# Patient Record
Sex: FEMALE | Race: WHITE | NOT HISPANIC OR LATINO | Employment: FULL TIME | URBAN - METROPOLITAN AREA
[De-identification: names, ages, dates, MRNs, and addresses within clinical notes are randomized per-mention and may not be internally consistent; named-entity substitution may affect disease eponyms.]

---
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
## 2022-07-07 ENCOUNTER — OFFICE VISIT (OUTPATIENT)
Dept: URGENT CARE | Facility: CLINIC | Age: 22
End: 2022-07-07
Payer: MEDICAID

## 2022-07-07 VITALS
WEIGHT: 125 LBS | TEMPERATURE: 98 F | HEIGHT: 65 IN | RESPIRATION RATE: 16 BRPM | SYSTOLIC BLOOD PRESSURE: 104 MMHG | HEART RATE: 102 BPM | DIASTOLIC BLOOD PRESSURE: 65 MMHG | BODY MASS INDEX: 20.83 KG/M2 | OXYGEN SATURATION: 98 %

## 2022-07-07 DIAGNOSIS — U07.1 COVID-19 VIRUS INFECTION: Primary | ICD-10-CM

## 2022-07-07 DIAGNOSIS — R09.81 SINUS CONGESTION: ICD-10-CM

## 2022-07-07 LAB
CTP QC/QA: YES
SARS-COV-2 RDRP RESP QL NAA+PROBE: POSITIVE

## 2022-07-07 PROCEDURE — 3008F BODY MASS INDEX DOCD: CPT | Mod: CPTII,S$GLB,, | Performed by: PHYSICIAN ASSISTANT

## 2022-07-07 PROCEDURE — 3074F PR MOST RECENT SYSTOLIC BLOOD PRESSURE < 130 MM HG: ICD-10-PCS | Mod: CPTII,S$GLB,, | Performed by: PHYSICIAN ASSISTANT

## 2022-07-07 PROCEDURE — 3008F PR BODY MASS INDEX (BMI) DOCUMENTED: ICD-10-PCS | Mod: CPTII,S$GLB,, | Performed by: PHYSICIAN ASSISTANT

## 2022-07-07 PROCEDURE — U0002: ICD-10-PCS | Mod: QW,S$GLB,, | Performed by: PHYSICIAN ASSISTANT

## 2022-07-07 PROCEDURE — 3078F PR MOST RECENT DIASTOLIC BLOOD PRESSURE < 80 MM HG: ICD-10-PCS | Mod: CPTII,S$GLB,, | Performed by: PHYSICIAN ASSISTANT

## 2022-07-07 PROCEDURE — 99213 PR OFFICE/OUTPT VISIT, EST, LEVL III, 20-29 MIN: ICD-10-PCS | Mod: S$GLB,,, | Performed by: PHYSICIAN ASSISTANT

## 2022-07-07 PROCEDURE — U0002 COVID-19 LAB TEST NON-CDC: HCPCS | Mod: QW,S$GLB,, | Performed by: PHYSICIAN ASSISTANT

## 2022-07-07 PROCEDURE — 3078F DIAST BP <80 MM HG: CPT | Mod: CPTII,S$GLB,, | Performed by: PHYSICIAN ASSISTANT

## 2022-07-07 PROCEDURE — 1159F MED LIST DOCD IN RCRD: CPT | Mod: CPTII,S$GLB,, | Performed by: PHYSICIAN ASSISTANT

## 2022-07-07 PROCEDURE — 3074F SYST BP LT 130 MM HG: CPT | Mod: CPTII,S$GLB,, | Performed by: PHYSICIAN ASSISTANT

## 2022-07-07 PROCEDURE — 1160F RVW MEDS BY RX/DR IN RCRD: CPT | Mod: CPTII,S$GLB,, | Performed by: PHYSICIAN ASSISTANT

## 2022-07-07 PROCEDURE — 99213 OFFICE O/P EST LOW 20 MIN: CPT | Mod: S$GLB,,, | Performed by: PHYSICIAN ASSISTANT

## 2022-07-07 PROCEDURE — 1159F PR MEDICATION LIST DOCUMENTED IN MEDICAL RECORD: ICD-10-PCS | Mod: CPTII,S$GLB,, | Performed by: PHYSICIAN ASSISTANT

## 2022-07-07 PROCEDURE — 1160F PR REVIEW ALL MEDS BY PRESCRIBER/CLIN PHARMACIST DOCUMENTED: ICD-10-PCS | Mod: CPTII,S$GLB,, | Performed by: PHYSICIAN ASSISTANT

## 2022-07-07 RX ORDER — VENLAFAXINE 75 MG/1
75 TABLET ORAL 2 TIMES DAILY
COMMUNITY

## 2022-07-08 NOTE — PROGRESS NOTES
"Subjective:       Patient ID: Sima Jaimes is a 21 y.o. female.    Vitals:  height is 5' 5" (1.651 m) and weight is 56.7 kg (125 lb). Her temperature is 98.4 °F (36.9 °C). Her blood pressure is 104/65 and her pulse is 102. Her respiration is 16 and oxygen saturation is 98%.     Chief Complaint: Sinus Problem    Patient presents to urgent care with sinus congestion/pressure, PND and headache x 2 days. Patient has been taking OTC Ibuprofen with temporary relief. 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 Patient is COVID-19 vaccinated.    Sinus Problem  This is a new problem. The current episode started in the past 7 days. The problem is unchanged. There has been no fever. Her pain is at a severity of 3/10. The pain is mild. Associated symptoms include congestion, headaches, sinus pressure and a sore throat. Pertinent negatives include no chills, coughing, diaphoresis, ear pain, hoarse voice, neck pain, shortness of breath, sneezing or swollen glands. Treatments tried: OTC Ibuprofen. The treatment provided mild relief.       Constitution: Negative for chills, sweating, fatigue and fever.   HENT: Positive for congestion, postnasal drip, sinus pressure and sore throat. Negative for ear pain, drooling, nosebleeds, foreign body in nose, sinus pain, trouble swallowing and voice change.    Neck: Negative for neck pain, neck stiffness, painful lymph nodes and neck swelling.   Cardiovascular: Negative for chest pain, leg swelling, palpitations, sob on exertion and passing out.   Eyes: Negative for eye pain, eye redness, photophobia, double vision, blurred vision and eyelid swelling.   Respiratory: Negative for chest tightness, cough, sputum production, bloody sputum, shortness of breath, stridor and wheezing.    Gastrointestinal: Negative for abdominal pain, abdominal bloating, nausea, vomiting, constipation, diarrhea and heartburn.   Musculoskeletal: Negative for joint pain, joint " swelling, abnormal ROM of joint, back pain, muscle cramps and muscle ache.   Skin: Negative for rash and hives.   Allergic/Immunologic: Negative for seasonal allergies, food allergies, hives, itching and sneezing.   Neurological: Positive for headaches. Negative for dizziness, light-headedness, passing out, facial drooping, speech difficulty, loss of balance, altered mental status, loss of consciousness and seizures.   Hematologic/Lymphatic: Negative for swollen lymph nodes.   Psychiatric/Behavioral: Negative for altered mental status and nervous/anxious. The patient is not nervous/anxious.          Objective:      Physical Exam   Constitutional: She is oriented to person, place, and time. She appears well-developed. She is cooperative.  Non-toxic appearance. She does not appear ill. No distress.   HENT:   Head: Normocephalic and atraumatic.   Ears:   Right Ear: Hearing, tympanic membrane, external ear and ear canal normal.   Left Ear: Hearing, tympanic membrane, external ear and ear canal normal.   Nose: Mucosal edema and rhinorrhea present. No nasal deformity. No epistaxis. Right sinus exhibits no maxillary sinus tenderness and no frontal sinus tenderness. Left sinus exhibits no maxillary sinus tenderness and no frontal sinus tenderness.   Mouth/Throat: Uvula is midline and mucous membranes are normal. No trismus in the jaw. Normal dentition. No uvula swelling. Posterior oropharyngeal erythema and cobblestoning present. No oropharyngeal exudate or posterior oropharyngeal edema.   Eyes: Conjunctivae and lids are normal. No scleral icterus.   Neck: Trachea normal and phonation normal. Neck supple. No edema present. No erythema present. No neck rigidity present.   Cardiovascular: Normal rate, regular rhythm, normal heart sounds and normal pulses.   Pulmonary/Chest: Effort normal and breath sounds normal. No accessory muscle usage or stridor. No respiratory distress. She has no decreased breath sounds. She has no  wheezes. She has no rhonchi. She has no rales.   Abdominal: Normal appearance.   Musculoskeletal: Normal range of motion.         General: No deformity. Normal range of motion.   Lymphadenopathy:     She has no cervical adenopathy.   Neurological: She is alert and oriented to person, place, and time. She exhibits normal muscle tone. Coordination normal.   Skin: Skin is warm, dry, intact, not diaphoretic, not pale and no rash. Capillary refill takes less than 2 seconds.   Psychiatric: Her speech is normal and behavior is normal. Judgment and thought content normal.   Nursing note and vitals reviewed.        Results for orders placed or performed in visit on 07/07/22   POCT COVID-19 Rapid Screening   Result Value Ref Range    POC Rapid COVID Positive (A) Negative     Acceptable Yes        Assessment:       1. COVID-19 virus infection    2. Sinus congestion          Plan:     Discussed COVID-19 results with patient. Discussed treatment options with patient and patient would not like to try antiviral RXs at this time. CDC precautions given to patient. Advised close follow-up with PCP and/or Specialist for further evaluation as needed. ER precautions given to patient as well. Patient aware, verbalized understanding and agreed with plan of care.    COVID-19 virus infection    Sinus congestion  -     POCT COVID-19 Rapid Screening      Patient Instructions   You must understand that you've received an Urgent Care treatment only and that you may be released before all your medical problems are known or treated. You, the patient, will arrange for follow up care as instructed.  Follow up with your PCP or specialty clinic as directed if not improved or as needed. You can call [phone] to schedule an appointment with the appropriate provider.  If your condition worsens we recommend that you receive another evaluation at the Emergency Department for any concerns or worsening of condition.  Patient aware and  verbalized understanding.    Reviewed COVID-19 results with patient.  Counseled patient and answered questions in regards to COVID-19 testing.  Advised patient to go home, treat symptoms with over-the-counter (OTC) medications and avoid contact with others at this time.  Increase fluids and rest is important.  Humidifier use at home.  OTC Claritin or Zyrtec or Allegra daily as needed for nasal congestion/postnasal drip/allergies.  OTC Flonase Nasal Spray daily as needed for nasal congestion/postnasal drip/allergies.  Advised patient to take OTC VITAMIN C and VITAMIN D and ZINC unless contraindicated as discussed.  Alternate OTC Tylenol and Ibuprofen unless contraindicated every 4-6 hours as needed for pain, headache, fever, etc.  Info given for virtual visit, covid 19 information line, state info line.   Advised patient to follow-up with PCP and/or Specialist for further evaluation as needed.   Strict ER precautions given to patient.  Follow local/state guidelines per covid emergency.   Patient aware, verbalized understanding and agreed with plan of care.    CDC RECOMMENDATIONS  --IF test results are NEGATIVE and NO known high risk exposure to covid-19 virus, you can be excluded from work/school until:  o MINIMUM OF 24 hours fever-free without the use of fever-reducing medications AND  o Improvement in symptoms (e.g. cough, shortness of breath, fatigue, GI symptoms, etc)     --IF YOU ARE BEING TESTED BECAUSE OF A HIGH RISK EXPOSURE, which the CDC defines as direct contact 6 feet or less for >15 minutes with a known positive person, you should follow CDC guidelines as well as your employer/school protocols for safely returning to work/school.   *Please be aware that there are False Negative possibilities with testing, so you should return to work/school based upon CDC guidelines, not simply a negative result, unless your employer/school has a different RTW protocol/guidance for you.     --IF test results are POSITIVE  , you should be excluded from work/school until:  o At least 24 hours FEVER-FREE without the use of fever-reducing medications AND  o Improvement in symptoms (e.g., cough, shortness of breathing, fatigue, GI symptoms, etc) AND  o At least 5 days have passed since symptoms first appeared.    IF NOT IMPROVING, FOLLOW UP WITH VIRTUAL ONLINE VISIT WITH A PROVIDER 24/7 - FOR MORE INFORMATION OR TO DOWNLOAD THE [place], VISIT [facility] ANYWHERE [email or web address]/ANYWHERE  FOR 24/7 NURSE ADVICE, CALL [phone]  FOR COVID 19 RELATED QUESTIONS, CALL the [facility] covid hotline: [phone]  [place] FOR UP TO DATE INFORMATION: Text or dial 211, test keyword LACOVID -205 OR DIAL 211    HELPFUL EXTERNAL RESOURCES:  OFFICE OF PUBLIC HEALTH: [place] - http://ldh.[place].gov/ and 1-262.833.4047  CENTER FOR DISEASE CONTROL - https://www.cdc.gov/   WORLD HEALTH ORGANIZATION (WHO) - https://www.who.int/   CDC WHEN TO QUARANTINE - https://www.cdc.gov/coronavirus/2019-ncov/if-you-are-sick/quarantine.html     INFO ABOUT [facility] COVID-19 RAPID TESTING:  This test utilizes isothermal nucleic acid amplification technology to detect the SARS-CoV-2 RdRp nucleic acid segment.   The analytical sensitivity (limit of detection) is 125 genome equivalents/mL.   A POSITIVE result implies infection with the SARS-CoV-2 virus; the patient is presumed to be contagious.     A NEGATIVE result means that SARS-CoV-2 nucleic acids are not present above the limit of detection.   A NEGATIVE result should be treated as presumptive. It does not rule out the possibility of COVID-19 and should not be the sole basis for treatment decisions.   This test is only for use under the Food and Drug Administration s Emergency Use Authorization (EUA).   Commercial kits are provided by [employer]. Performance characteristics of the EUA have been independently verified by [facility] Department of Pathology and Laboratory Medicine.    _________________________________________________________________   The authorized Fact Sheet for Healthcare Providers and the authorized Fact Sheet for Patients of the ID NOW COVID-19 are available on the FDA website:   https://www.fda.gov/media/699939/download  https://www.fda.gov/media/719480/download

## 2022-07-08 NOTE — PATIENT INSTRUCTIONS
You must understand that you've received an Urgent Care treatment only and that you may be released before all your medical problems are known or treated. You, the patient, will arrange for follow up care as instructed.  Follow up with your PCP or specialty clinic as directed if not improved or as needed. You can call [phone] to schedule an appointment with the appropriate provider.  If your condition worsens we recommend that you receive another evaluation at the Emergency Department for any concerns or worsening of condition.  Patient aware and verbalized understanding.    Reviewed COVID-19 results with patient.  Counseled patient and answered questions in regards to COVID-19 testing.  Advised patient to go home, treat symptoms with over-the-counter (OTC) medications and avoid contact with others at this time.  Increase fluids and rest is important.  Humidifier use at home.  OTC Claritin or Zyrtec or Allegra daily as needed for nasal congestion/postnasal drip/allergies.  OTC Flonase Nasal Spray daily as needed for nasal congestion/postnasal drip/allergies.  Advised patient to take OTC VITAMIN C and VITAMIN D and ZINC unless contraindicated as discussed.  Alternate OTC Tylenol and Ibuprofen unless contraindicated every 4-6 hours as needed for pain, headache, fever, etc.  Info given for virtual visit, covid 19 information line, state info line.   Advised patient to follow-up with PCP and/or Specialist for further evaluation as needed.   Strict ER precautions given to patient.  Follow local/state guidelines per covid emergency.   Patient aware, verbalized understanding and agreed with plan of care.    CDC RECOMMENDATIONS  --IF test results are NEGATIVE and NO known high risk exposure to covid-19 virus, you can be excluded from work/school until:  o MINIMUM OF 24 hours fever-free without the use of fever-reducing medications AND  o Improvement in symptoms (e.g. cough, shortness of breath, fatigue, GI symptoms,  etc)     --IF YOU ARE BEING TESTED BECAUSE OF A HIGH RISK EXPOSURE, which the CDC defines as direct contact 6 feet or less for >15 minutes with a known positive person, you should follow CDC guidelines as well as your employer/school protocols for safely returning to work/school.   *Please be aware that there are False Negative possibilities with testing, so you should return to work/school based upon CDC guidelines, not simply a negative result, unless your employer/school has a different RTW protocol/guidance for you.     --IF test results are POSITIVE , you should be excluded from work/school until:  o At least 24 hours FEVER-FREE without the use of fever-reducing medications AND  o Improvement in symptoms (e.g., cough, shortness of breathing, fatigue, GI symptoms, etc) AND  o At least 5 days have passed since symptoms first appeared.    IF NOT IMPROVING, FOLLOW UP WITH VIRTUAL ONLINE VISIT WITH A PROVIDER 24/7 - FOR MORE INFORMATION OR TO DOWNLOAD THE [place], VISIT [facility] ANYWHERE [facility] AT [email or web address] [place]/ANYWHERE  FOR 24/7 NURSE ADVICE, CALL [phone]  FOR COVID 19 RELATED QUESTIONS, CALL the [facility] covid hotline: [phone]  [place] FOR UP TO DATE INFORMATION: Text or dial 211, test keyword LACOVID -106 OR DIAL 368    HELPFUL EXTERNAL RESOURCES:  OFFICE OF PUBLIC HEALTH: [place] - http://ldh.[place].gov/ and 1-320.561.1067  CENTER FOR DISEASE CONTROL - https://www.cdc.gov/   WORLD HEALTH ORGANIZATION (WHO) - https://www.who.int/   CDC WHEN TO QUARANTINE - https://www.cdc.gov/coronavirus/2019-ncov/if-you-are-sick/quarantine.html     INFO ABOUT [facility] COVID-19 RAPID TESTING:  This test utilizes isothermal nucleic acid amplification technology to detect the SARS-CoV-2 RdRp nucleic acid segment.   The analytical sensitivity (limit of detection) is 125 genome equivalents/mL.   A POSITIVE result implies infection with the SARS-CoV-2 virus; the patient is presumed to be contagious.     A NEGATIVE result means  that SARS-CoV-2 nucleic acids are not present above the limit of detection.   A NEGATIVE result should be treated as presumptive. It does not rule out the possibility of COVID-19 and should not be the sole basis for treatment decisions.   This test is only for use under the Food and Drug Administration s Emergency Use Authorization (EUA).   Commercial kits are provided by [employer]. Performance characteristics of the EUA have been independently verified by [facility] Department of Pathology and Laboratory Medicine.   _________________________________________________________________   The authorized Fact Sheet for Healthcare Providers and the authorized Fact Sheet for Patients of the ID NOW COVID-19 are available on the FDA website:   https://www.fda.gov/media/351600/download  https://www.fda.gov/media/790006/download

## 2023-10-09 ENCOUNTER — OFFICE VISIT (OUTPATIENT)
Dept: URGENT CARE | Facility: CLINIC | Age: 23
End: 2023-10-09
Payer: MEDICAID

## 2023-10-09 VITALS
OXYGEN SATURATION: 97 % | WEIGHT: 125 LBS | HEART RATE: 95 BPM | TEMPERATURE: 99 F | RESPIRATION RATE: 16 BRPM | BODY MASS INDEX: 20.83 KG/M2 | HEIGHT: 65 IN | DIASTOLIC BLOOD PRESSURE: 88 MMHG | SYSTOLIC BLOOD PRESSURE: 122 MMHG

## 2023-10-09 DIAGNOSIS — J40 BRONCHITIS: Primary | ICD-10-CM

## 2023-10-09 DIAGNOSIS — R05.9 COUGH, UNSPECIFIED TYPE: ICD-10-CM

## 2023-10-09 DIAGNOSIS — R06.2 WHEEZING: ICD-10-CM

## 2023-10-09 PROCEDURE — 94640 AIRWAY INHALATION TREATMENT: CPT | Mod: S$GLB,,,

## 2023-10-09 PROCEDURE — 71046 X-RAY EXAM CHEST 2 VIEWS: CPT | Mod: S$GLB,,, | Performed by: RADIOLOGY

## 2023-10-09 PROCEDURE — 99213 PR OFFICE/OUTPT VISIT, EST, LEVL III, 20-29 MIN: ICD-10-PCS | Mod: 25,S$GLB,,

## 2023-10-09 PROCEDURE — 94640 PR INHAL RX, AIRWAY OBST/DX SPUTUM INDUCT: ICD-10-PCS | Mod: S$GLB,,,

## 2023-10-09 PROCEDURE — 71046 XR CHEST PA AND LATERAL: ICD-10-PCS | Mod: S$GLB,,, | Performed by: RADIOLOGY

## 2023-10-09 PROCEDURE — 99213 OFFICE O/P EST LOW 20 MIN: CPT | Mod: 25,S$GLB,,

## 2023-10-09 RX ORDER — IPRATROPIUM BROMIDE 0.5 MG/2.5ML
0.5 SOLUTION RESPIRATORY (INHALATION)
Status: COMPLETED | OUTPATIENT
Start: 2023-10-09 | End: 2023-10-09

## 2023-10-09 RX ORDER — ULIPRISTAL ACETATE 30 MG/1
30 TABLET ORAL ONCE
COMMUNITY
Start: 2023-09-29

## 2023-10-09 RX ORDER — ALBUTEROL SULFATE 90 UG/1
2 AEROSOL, METERED RESPIRATORY (INHALATION) EVERY 6 HOURS PRN
Qty: 18 G | Refills: 0 | Status: SHIPPED | OUTPATIENT
Start: 2023-10-09

## 2023-10-09 RX ORDER — PROMETHAZINE HYDROCHLORIDE AND DEXTROMETHORPHAN HYDROBROMIDE 6.25; 15 MG/5ML; MG/5ML
5 SYRUP ORAL EVERY 6 HOURS PRN
Qty: 118 ML | Refills: 0 | Status: SHIPPED | OUTPATIENT
Start: 2023-10-09 | End: 2023-10-19

## 2023-10-09 RX ORDER — ALBUTEROL SULFATE 0.83 MG/ML
2.5 SOLUTION RESPIRATORY (INHALATION)
Status: COMPLETED | OUTPATIENT
Start: 2023-10-09 | End: 2023-10-09

## 2023-10-09 RX ORDER — BENZONATATE 200 MG/1
200 CAPSULE ORAL 3 TIMES DAILY PRN
Qty: 30 CAPSULE | Refills: 0 | Status: SHIPPED | OUTPATIENT
Start: 2023-10-09 | End: 2023-10-19

## 2023-10-09 RX ORDER — VORTIOXETINE 10 MG/1
1 TABLET, FILM COATED ORAL
COMMUNITY
Start: 2023-10-02

## 2023-10-09 RX ADMIN — ALBUTEROL SULFATE 2.5 MG: 0.83 SOLUTION RESPIRATORY (INHALATION) at 05:10

## 2023-10-09 RX ADMIN — IPRATROPIUM BROMIDE 0.5 MG: 0.5 SOLUTION RESPIRATORY (INHALATION) at 05:10

## 2023-10-09 NOTE — PATIENT INSTRUCTIONS
Please drink plenty of fluids.  Please get plenty of rest.  Please utilize saltwater gargles for sore throat.  Please utilize warm tea, and lemon for sore throat relief.  Please utilize over-the-counter throat numbing spray and lozenges for sore throat relief.    Please return here or go to the Emergency Department for any concerns or worsening of condition.    Please use ALBUTEROL INHALER for wheezing/shortness of breath as needed.  Please take TESSALON during the day for cough.  Please take PROMETHAZINE DM at night for cough.   You were prescribed Promethazine DM, this medication can make you drowsy, please avoid driving or operating heavy machinery while taking this medication.     If you do not have Hypertension or any history of palpitations, it is ok to take over the counter Sudafed or Mucinex D or Allegra-D or Claritin-D or Zyrtec-D.  If you do take one of the above, it is ok to combine that with plain over the counter Mucinex or Allegra or Claritin or Zyrtec.  If for example you are taking Zyrtec -D, you can combine that with Mucinex, but not Mucinex-D.  If you are taking Mucinex-D, you can combine that with plain Allegra or Claritin or Zyrtec.   If you do have Hypertension or palpitations, it is safe to take Coricidin HBP for relief of sinus symptoms.  We recommend you take over the counter Flonase (Fluticasone) or another nasally inhaled steroid unless you are already taking one.  Nasal irrigation with a saline spray or Netti Pot like device per their directions is also recommended.  If not allergic, please take over the counter Tylenol (Acetaminophen) and/or Motrin (Ibuprofen) as directed for control of pain and/or fever.  Please follow up with your primary care doctor or specialist as needed.    If you  smoke, please stop smoking.

## 2023-10-09 NOTE — PROGRESS NOTES
"Subjective:      Patient ID: Sima Jaimes is a 23 y.o. female.    Vitals:  height is 5' 5" (1.651 m) and weight is 56.7 kg (125 lb). Her oral temperature is 99.2 °F (37.3 °C). Her blood pressure is 122/88 and her pulse is 95. Her respiration is 16 and oxygen saturation is 97%.     Chief Complaint: Cough    Patient states that she has been having a persistent cough for 2 weeks. Associated symptoms include wheezing, headaches, and CP only during coughing spells. She states that she has tried mucinex and ibuprofen with mild improvement of symptoms. She denies fever, chills, CP at rest, CP on exertion, SOB, nausea, vomiting, abdominal pain, hemoptysis.    Cough  This is a new problem. The current episode started 1 to 4 weeks ago (2 weeks). The problem has been unchanged. The problem occurs constantly. The cough is Productive of sputum. Associated symptoms include chest pain (only during coughing spells), headaches, myalgias and wheezing. Pertinent negatives include no chills, ear congestion, ear pain, fever, heartburn, hemoptysis, nasal congestion, postnasal drip, rash, rhinorrhea, sore throat, shortness of breath, sweats or weight loss. She has tried OTC cough suppressant (ibuprofen, mucinex) for the symptoms. The treatment provided mild relief. Her past medical history is significant for bronchitis. There is no history of asthma or COPD.       Constitution: Negative for chills and fever.   HENT:  Negative for ear pain, postnasal drip and sore throat.    Cardiovascular:  Positive for chest pain (only during coughing spells). Negative for sob on exertion.   Respiratory:  Positive for cough, sputum production and wheezing. Negative for bloody sputum and shortness of breath.    Gastrointestinal:  Negative for abdominal pain, nausea, vomiting, diarrhea and heartburn.   Musculoskeletal:  Positive for muscle ache.   Skin:  Negative for rash.   Neurological:  Positive for headaches.      Objective:     Physical Exam "   Constitutional:  Non-toxic appearance. She does not appear ill. No distress.      Comments:Patient is in no acute distress, patient is non-toxic appearing, patient is ox3, patient is answering question appropriately.   normal  HENT:   Head: Normocephalic.   Ears:   Right Ear: Tympanic membrane, external ear and ear canal normal. impacted cerumen  Left Ear: Tympanic membrane, external ear and ear canal normal. impacted cerumen  Nose: No rhinorrhea or congestion.   Mouth/Throat: No oropharyngeal exudate or posterior oropharyngeal erythema. Oropharynx is clear.   No drooling, no tripoding. Patient is able to handle secretions. Patient appears to be in no acute respiratory distress. Airway is intact. Patient is able to talk in complete sentences without discomfort.      Comments: No drooling, no tripoding. Patient is able to handle secretions. Patient appears to be in no acute respiratory distress. Airway is intact. Patient is able to talk in complete sentences without discomfort.  Cardiovascular: Normal rate, regular rhythm and normal heart sounds.   No murmur heard.Exam reveals no gallop and no friction rub.   Pulmonary/Chest: Effort normal. No stridor. No respiratory distress. She has wheezes. She has no rhonchi. She has no rales. She exhibits no tenderness.         Comments: Patient is in no respiratory distress. Breath sounds even, unlabored with mild wheezing appreciated. No accessory muscle usage. Patient able to speak in complete sentences with ease.    Abdominal: Normal appearance.   Lymphadenopathy:     She has no cervical adenopathy.   Neurological: She is alert.   Skin: Skin is not diaphoretic.   Nursing note and vitals reviewed.    XR CHEST PA AND LATERAL    Result Date: 10/9/2023  EXAMINATION: XR CHEST PA AND LATERAL CLINICAL HISTORY: Cough, unspecified TECHNIQUE: PA and lateral views of the chest were performed. COMPARISON: 05/17/2021 FINDINGS: The cardiomediastinal silhouette is not enlarged.  There is  no pleural effusion.  The trachea is midline.  The lungs are symmetrically expanded bilaterally without evidence of acute parenchymal process. No large focal consolidation seen.  There is no pneumothorax.  The osseous structures are unremarkable.     1. No acute cardiopulmonary process. Electronically signed by: Bro Fairbanks MD Date:    10/09/2023 Time:    16:56     Assessment:     1. Bronchitis    2. Cough, unspecified type    3. Wheezing      Plan:   Previous notes reviewed.  Vital signs reviewed.  Labs ordered. Labs reviewed.  DuoNeb given in clinic. Wheezing abated, patient states that she feels improvement of symptoms.  Discussed bronchitis, home care, tx options, and given follow up precautions.  Patient was briefed on my thought process and diagnosis.   Patient involved with the treatment plan and agreed to the plan.  Patient informed on warning signs, patient understood warning signs and to go to urgent care or ER if warning signs appear.  Please excuse grammatical/spelling errors appreciated throughout this visit encounter for a remote dictation device was used during this encounter.    Patient Instructions   Please drink plenty of fluids.  Please get plenty of rest.  Please utilize saltwater gargles for sore throat.  Please utilize warm tea, and lemon for sore throat relief.  Please utilize over-the-counter throat numbing spray and lozenges for sore throat relief.    Please return here or go to the Emergency Department for any concerns or worsening of condition.    Please use ALBUTEROL INHALER for wheezing/shortness of breath as needed.  Please take TESSALON during the day for cough.  Please take PROMETHAZINE DM at night for cough.   You were prescribed Promethazine DM, this medication can make you drowsy, please avoid driving or operating heavy machinery while taking this medication.     If you do not have Hypertension or any history of palpitations, it is ok to take over the counter Sudafed or Mucinex  D or Allegra-D or Claritin-D or Zyrtec-D.  If you do take one of the above, it is ok to combine that with plain over the counter Mucinex or Allegra or Claritin or Zyrtec.  If for example you are taking Zyrtec -D, you can combine that with Mucinex, but not Mucinex-D.  If you are taking Mucinex-D, you can combine that with plain Allegra or Claritin or Zyrtec.   If you do have Hypertension or palpitations, it is safe to take Coricidin HBP for relief of sinus symptoms.  We recommend you take over the counter Flonase (Fluticasone) or another nasally inhaled steroid unless you are already taking one.  Nasal irrigation with a saline spray or Netti Pot like device per their directions is also recommended.  If not allergic, please take over the counter Tylenol (Acetaminophen) and/or Motrin (Ibuprofen) as directed for control of pain and/or fever.  Please follow up with your primary care doctor or specialist as needed.    If you  smoke, please stop smoking.    Bronchitis  -     albuterol (PROVENTIL HFA) 90 mcg/actuation inhaler; Inhale 2 puffs into the lungs every 6 (six) hours as needed for Wheezing. Rescue  Dispense: 18 g; Refill: 0  -     benzonatate (TESSALON) 200 MG capsule; Take 1 capsule (200 mg total) by mouth 3 (three) times daily as needed for Cough.  Dispense: 30 capsule; Refill: 0  -     promethazine-dextromethorphan (PROMETHAZINE-DM) 6.25-15 mg/5 mL Syrp; Take 5 mLs by mouth every 6 (six) hours as needed (cough).  Dispense: 118 mL; Refill: 0    Cough, unspecified type  -     XR CHEST PA AND LATERAL; Future; Expected date: 10/09/2023    Wheezing  -     albuterol nebulizer solution 2.5 mg  -     ipratropium 0.02 % nebulizer solution 0.5 mg      Rick Acuña PA-C